# Patient Record
Sex: FEMALE | URBAN - METROPOLITAN AREA
[De-identification: names, ages, dates, MRNs, and addresses within clinical notes are randomized per-mention and may not be internally consistent; named-entity substitution may affect disease eponyms.]

---

## 2020-10-30 ENCOUNTER — APPOINTMENT (OUTPATIENT)
Age: 80
Setting detail: DERMATOLOGY
End: 2020-11-11

## 2020-10-30 DIAGNOSIS — R20.8 OTHER DISTURBANCES OF SKIN SENSATION: ICD-10-CM

## 2020-10-30 DIAGNOSIS — L57.0 ACTINIC KERATOSIS: ICD-10-CM

## 2020-10-30 DIAGNOSIS — L57.8 OTHER SKIN CHANGES DUE TO CHRONIC EXPOSURE TO NONIONIZING RADIATION: ICD-10-CM

## 2020-10-30 PROBLEM — D48.5 NEOPLASM OF UNCERTAIN BEHAVIOR OF SKIN: Status: ACTIVE | Noted: 2020-10-30

## 2020-10-30 PROCEDURE — 11102 TANGNTL BX SKIN SINGLE LES: CPT

## 2020-10-30 PROCEDURE — 11103 TANGNTL BX SKIN EA SEP/ADDL: CPT

## 2020-10-30 PROCEDURE — OTHER MIPS QUALITY: OTHER

## 2020-10-30 PROCEDURE — 99203 OFFICE O/P NEW LOW 30 MIN: CPT | Mod: 25

## 2020-10-30 PROCEDURE — OTHER COUNSELING: OTHER

## 2020-10-30 PROCEDURE — OTHER BIOPSY BY SHAVE METHOD: OTHER

## 2020-10-30 PROCEDURE — OTHER SUNSCREEN RECOMMENDATIONS: OTHER

## 2020-10-30 ASSESSMENT — LOCATION DETAILED DESCRIPTION DERM
LOCATION DETAILED: STERNAL NOTCH
LOCATION DETAILED: RIGHT LATERAL MANDIBULAR CHEEK
LOCATION DETAILED: LEFT INFERIOR ANTERIOR NECK
LOCATION DETAILED: MIDDLE STERNUM
LOCATION DETAILED: RIGHT SUPERIOR UPPER BACK
LOCATION DETAILED: RIGHT INFERIOR MEDIAL MALAR CHEEK
LOCATION DETAILED: LEFT INFERIOR MEDIAL MALAR CHEEK
LOCATION DETAILED: LEFT MEDIAL MALAR CHEEK

## 2020-10-30 ASSESSMENT — LOCATION ZONE DERM
LOCATION ZONE: TRUNK
LOCATION ZONE: FACE
LOCATION ZONE: NECK

## 2020-10-30 ASSESSMENT — LOCATION SIMPLE DESCRIPTION DERM
LOCATION SIMPLE: RIGHT CHEEK
LOCATION SIMPLE: RIGHT UPPER BACK
LOCATION SIMPLE: LEFT CHEEK
LOCATION SIMPLE: LEFT ANTERIOR NECK
LOCATION SIMPLE: CHEST

## 2020-12-11 ENCOUNTER — APPOINTMENT (OUTPATIENT)
Age: 80
Setting detail: DERMATOLOGY
End: 2020-12-15

## 2020-12-11 DIAGNOSIS — L57.0 ACTINIC KERATOSIS: ICD-10-CM

## 2020-12-11 DIAGNOSIS — Z48.02 ENCOUNTER FOR REMOVAL OF SUTURES: ICD-10-CM

## 2020-12-11 PROBLEM — C44.319 BASAL CELL CARCINOMA OF SKIN OF OTHER PARTS OF FACE: Status: ACTIVE | Noted: 2020-12-11

## 2020-12-11 PROCEDURE — OTHER LIQUID NITROGEN: OTHER

## 2020-12-11 PROCEDURE — 17003 DESTRUCT PREMALG LES 2-14: CPT

## 2020-12-11 PROCEDURE — OTHER REFERRAL: OTHER

## 2020-12-11 PROCEDURE — OTHER COUNSELING: OTHER

## 2020-12-11 PROCEDURE — OTHER POST-OP WOUND EVALUATION: OTHER

## 2020-12-11 PROCEDURE — 99213 OFFICE O/P EST LOW 20 MIN: CPT | Mod: 25

## 2020-12-11 PROCEDURE — 17000 DESTRUCT PREMALG LESION: CPT

## 2020-12-11 PROCEDURE — OTHER CONSULTATION FOR MOHS SURGERY: OTHER

## 2020-12-11 ASSESSMENT — LOCATION SIMPLE DESCRIPTION DERM
LOCATION SIMPLE: CHEST
LOCATION SIMPLE: LEFT CHEEK
LOCATION SIMPLE: RIGHT CHEEK

## 2020-12-11 ASSESSMENT — LOCATION DETAILED DESCRIPTION DERM
LOCATION DETAILED: LEFT INFERIOR CENTRAL MALAR CHEEK
LOCATION DETAILED: RIGHT INFERIOR MEDIAL MALAR CHEEK
LOCATION DETAILED: LEFT INFERIOR MEDIAL MALAR CHEEK
LOCATION DETAILED: STERNAL NOTCH

## 2020-12-11 ASSESSMENT — LOCATION ZONE DERM
LOCATION ZONE: TRUNK
LOCATION ZONE: FACE

## 2020-12-22 ENCOUNTER — APPOINTMENT (OUTPATIENT)
Age: 80
Setting detail: DERMATOLOGY
End: 2020-12-22

## 2020-12-22 PROBLEM — C44.319 BASAL CELL CARCINOMA OF SKIN OF OTHER PARTS OF FACE: Status: ACTIVE | Noted: 2020-12-22

## 2020-12-22 PROCEDURE — 17311 MOHS 1 STAGE H/N/HF/G: CPT

## 2020-12-22 PROCEDURE — 13132 CMPLX RPR F/C/C/M/N/AX/G/H/F: CPT

## 2020-12-22 PROCEDURE — OTHER MOHS SURGERY: OTHER

## 2020-12-22 PROCEDURE — OTHER REFERRAL: OTHER

## 2020-12-22 PROCEDURE — OTHER COUNSELING: OTHER

## 2020-12-22 PROCEDURE — OTHER REPAIR NOTE: OTHER

## 2020-12-22 PROCEDURE — 17312 MOHS ADDL STAGE: CPT

## 2021-01-05 ENCOUNTER — APPOINTMENT (OUTPATIENT)
Age: 81
Setting detail: DERMATOLOGY
End: 2021-01-07

## 2021-01-05 VITALS — HEIGHT: 60 IN | WEIGHT: 131 LBS | TEMPERATURE: 96.6 F

## 2021-01-05 VITALS — HEIGHT: 60 IN | WEIGHT: 131 LBS

## 2021-01-05 DIAGNOSIS — Z85.828 PERSONAL HISTORY OF OTHER MALIGNANT NEOPLASM OF SKIN: ICD-10-CM

## 2021-01-05 DIAGNOSIS — D485 NEOPLASM OF UNCERTAIN BEHAVIOR OF SKIN: ICD-10-CM

## 2021-01-05 DIAGNOSIS — Z48.02 ENCOUNTER FOR REMOVAL OF SUTURES: ICD-10-CM

## 2021-01-05 PROBLEM — D48.5 NEOPLASM OF UNCERTAIN BEHAVIOR OF SKIN: Status: ACTIVE | Noted: 2021-01-05

## 2021-01-05 PROCEDURE — OTHER REFERRAL: OTHER

## 2021-01-05 PROCEDURE — OTHER MIPS QUALITY: OTHER

## 2021-01-05 PROCEDURE — 99024 POSTOP FOLLOW-UP VISIT: CPT

## 2021-01-05 PROCEDURE — OTHER SUNSCREEN RECOMMENDATIONS: OTHER

## 2021-01-05 PROCEDURE — OTHER SUTURE REMOVAL (GLOBAL PERIOD): OTHER

## 2021-01-05 PROCEDURE — OTHER CONSULTATION FOR MOHS SURGERY: OTHER

## 2021-01-05 PROCEDURE — OTHER COUNSELING: OTHER

## 2021-01-05 ASSESSMENT — LOCATION SIMPLE DESCRIPTION DERM
LOCATION SIMPLE: RIGHT CHEEK
LOCATION SIMPLE: CHEST

## 2021-01-05 ASSESSMENT — LOCATION ZONE DERM
LOCATION ZONE: FACE
LOCATION ZONE: TRUNK

## 2021-01-05 ASSESSMENT — LOCATION DETAILED DESCRIPTION DERM
LOCATION DETAILED: RIGHT INFERIOR MEDIAL MALAR CHEEK
LOCATION DETAILED: STERNAL NOTCH

## 2021-01-05 NOTE — PROCEDURE: SUTURE REMOVAL (GLOBAL PERIOD)
Detail Level: Detailed
Add 22790 Cpt? (Important Note: In 2017 The Use Of 10504 Is Being Tracked By Cms To Determine Future Global Period Reimbursement For Global Periods): yes

## 2021-01-05 NOTE — PROCEDURE: MIPS QUALITY
Quality 226: Preventive Care And Screening: Tobacco Use: Screening And Cessation Intervention: Patient screened for tobacco use, is a smoker AND received Cessation Counseling
Quality 110: Preventive Care And Screening: Influenza Immunization: Influenza Immunization Administered during Influenza season
Quality 111:Pneumonia Vaccination Status For Older Adults: Pneumococcal Vaccination Previously Received
Quality 130: Documentation Of Current Medications In The Medical Record: Current Medications Documented
Quality 47: Advance Care Plan: Advance Care Planning discussed and documented in the medical record; patient did not wish or was not able to name a surrogate decision maker or provide an advance care plan.
Detail Level: Detailed

## 2021-01-19 ENCOUNTER — APPOINTMENT (OUTPATIENT)
Age: 81
Setting detail: DERMATOLOGY
End: 2021-01-21

## 2021-01-19 ENCOUNTER — APPOINTMENT (OUTPATIENT)
Age: 81
Setting detail: DERMATOLOGY
End: 2021-01-19

## 2021-01-19 PROBLEM — C44.529 SQUAMOUS CELL CARCINOMA OF SKIN OF OTHER PART OF TRUNK: Status: ACTIVE | Noted: 2021-01-19

## 2021-01-19 PROCEDURE — OTHER MOHS SURGERY: OTHER

## 2021-01-19 PROCEDURE — OTHER CONSULTATION FOR MOHS SURGERY: OTHER

## 2021-01-19 PROCEDURE — OTHER COUNSELING: OTHER

## 2021-01-19 PROCEDURE — 17314 MOHS ADDL STAGE T/A/L: CPT

## 2021-01-19 PROCEDURE — OTHER PRESCRIPTION: OTHER

## 2021-01-19 PROCEDURE — OTHER REFERRAL: OTHER

## 2021-01-19 PROCEDURE — 13101 CMPLX RPR TRUNK 2.6-7.5 CM: CPT

## 2021-01-19 PROCEDURE — OTHER REPAIR NOTE: OTHER

## 2021-01-19 PROCEDURE — 17313 MOHS 1 STAGE T/A/L: CPT

## 2021-01-19 RX ORDER — CEPHALEXIN 500 MG/1
CAPSULE ORAL BID
Qty: 28 | Refills: 0 | Status: ERX | COMMUNITY
Start: 2021-01-19

## 2021-01-19 RX ORDER — MUPIROCIN 20 MG/G
OINTMENT TOPICAL BID
Qty: 1 | Refills: 0 | Status: ERX | COMMUNITY
Start: 2021-01-19

## 2021-01-19 NOTE — PROCEDURE: MOHS SURGERY
normal (ped)... Consent (Ear)/Introductory Paragraph: The rationale for Mohs was explained to the patient and consent was obtained. The risks, benefits and alternatives to therapy were discussed in detail. Specifically, the risks of ear deformity, infection, scarring, bleeding, prolonged wound healing, incomplete removal, allergy to anesthesia, nerve injury and recurrence were addressed. Prior to the procedure, the treatment site was clearly identified and confirmed by the patient. All components of Universal Protocol/PAUSE Rule completed.

## 2021-02-03 ENCOUNTER — APPOINTMENT (OUTPATIENT)
Age: 81
Setting detail: DERMATOLOGY
End: 2021-02-04

## 2021-02-03 DIAGNOSIS — Z48.02 ENCOUNTER FOR REMOVAL OF SUTURES: ICD-10-CM

## 2021-02-03 DIAGNOSIS — Z85.828 PERSONAL HISTORY OF OTHER MALIGNANT NEOPLASM OF SKIN: ICD-10-CM

## 2021-02-03 PROCEDURE — OTHER COUNSELING: OTHER

## 2021-02-03 PROCEDURE — OTHER ADDITIONAL NOTES: OTHER

## 2021-02-03 PROCEDURE — 99024 POSTOP FOLLOW-UP VISIT: CPT

## 2021-02-03 PROCEDURE — OTHER SUNSCREEN RECOMMENDATIONS: OTHER

## 2021-02-03 PROCEDURE — OTHER SUTURE REMOVAL (GLOBAL PERIOD): OTHER

## 2021-02-03 ASSESSMENT — LOCATION DETAILED DESCRIPTION DERM
LOCATION DETAILED: RIGHT INFERIOR MEDIAL MALAR CHEEK
LOCATION DETAILED: STERNAL NOTCH

## 2021-02-03 ASSESSMENT — LOCATION SIMPLE DESCRIPTION DERM
LOCATION SIMPLE: RIGHT CHEEK
LOCATION SIMPLE: CHEST

## 2021-02-03 ASSESSMENT — LOCATION ZONE DERM
LOCATION ZONE: TRUNK
LOCATION ZONE: FACE

## 2021-02-03 NOTE — PROCEDURE: SUTURE REMOVAL (GLOBAL PERIOD)
Detail Level: Detailed
Add 15415 Cpt? (Important Note: In 2017 The Use Of 96341 Is Being Tracked By Cms To Determine Future Global Period Reimbursement For Global Periods): yes

## 2021-02-03 NOTE — PROCEDURE: ADDITIONAL NOTES
Render Risk Assessment In Note?: yes
Detail Level: Detailed
Additional Notes: Advised of scar sheets

## 2021-04-12 ENCOUNTER — APPOINTMENT (OUTPATIENT)
Age: 81
Setting detail: DERMATOLOGY
End: 2021-04-19

## 2021-04-12 DIAGNOSIS — Z85.828 PERSONAL HISTORY OF OTHER MALIGNANT NEOPLASM OF SKIN: ICD-10-CM

## 2021-04-12 DIAGNOSIS — L57.0 ACTINIC KERATOSIS: ICD-10-CM

## 2021-04-12 PROCEDURE — OTHER COUNSELING: OTHER

## 2021-04-12 PROCEDURE — 17000 DESTRUCT PREMALG LESION: CPT

## 2021-04-12 PROCEDURE — 99212 OFFICE O/P EST SF 10 MIN: CPT | Mod: 25

## 2021-04-12 PROCEDURE — OTHER SUNSCREEN RECOMMENDATIONS: OTHER

## 2021-04-12 PROCEDURE — OTHER LIQUID NITROGEN: OTHER

## 2021-04-12 PROCEDURE — OTHER MIPS QUALITY: OTHER

## 2021-04-12 PROCEDURE — OTHER ADDITIONAL NOTES: OTHER

## 2021-04-12 ASSESSMENT — LOCATION ZONE DERM
LOCATION ZONE: FACE
LOCATION ZONE: TRUNK

## 2021-04-12 ASSESSMENT — LOCATION DETAILED DESCRIPTION DERM
LOCATION DETAILED: STERNAL NOTCH
LOCATION DETAILED: RIGHT INFERIOR MEDIAL MALAR CHEEK
LOCATION DETAILED: RIGHT MEDIAL SUPERIOR CHEST

## 2021-04-12 ASSESSMENT — LOCATION SIMPLE DESCRIPTION DERM
LOCATION SIMPLE: CHEST
LOCATION SIMPLE: RIGHT CHEEK

## 2021-04-12 NOTE — PROCEDURE: ADDITIONAL NOTES
Additional Notes: Advised of scar sheets
Render Risk Assessment In Note?: yes
Detail Level: Detailed
